# Patient Record
Sex: MALE | Race: WHITE
[De-identification: names, ages, dates, MRNs, and addresses within clinical notes are randomized per-mention and may not be internally consistent; named-entity substitution may affect disease eponyms.]

---

## 2020-08-27 ENCOUNTER — HOSPITAL ENCOUNTER (EMERGENCY)
Dept: HOSPITAL 35 - ER | Age: 75
Discharge: LEFT BEFORE DISCHARGE | End: 2020-08-27
Payer: COMMERCIAL

## 2020-08-27 VITALS — SYSTOLIC BLOOD PRESSURE: 134 MMHG | DIASTOLIC BLOOD PRESSURE: 74 MMHG

## 2020-08-27 VITALS — BODY MASS INDEX: 25.9 KG/M2 | HEIGHT: 71 IN | WEIGHT: 185.01 LBS

## 2020-08-27 DIAGNOSIS — Z79.899: ICD-10-CM

## 2020-08-27 DIAGNOSIS — F17.210: ICD-10-CM

## 2020-08-27 DIAGNOSIS — Z79.82: ICD-10-CM

## 2020-08-27 DIAGNOSIS — R55: Primary | ICD-10-CM

## 2020-08-27 DIAGNOSIS — Z53.29: ICD-10-CM

## 2020-08-27 LAB
ALBUMIN SERPL-MCNC: 3.5 G/DL (ref 3.4–5)
ALT SERPL-CCNC: 19 U/L (ref 30–65)
ANION GAP SERPL CALC-SCNC: 13 MMOL/L (ref 7–16)
AST SERPL-CCNC: 26 U/L (ref 15–37)
BASOPHILS NFR BLD AUTO: 0.2 % (ref 0–2)
BILIRUB DIRECT SERPL-MCNC: 0.1 MG/DL
BILIRUB SERPL-MCNC: 0.6 MG/DL (ref 0.2–1)
BUN SERPL-MCNC: 11 MG/DL (ref 7–18)
CALCIUM SERPL-MCNC: 8.6 MG/DL (ref 8.5–10.1)
CHLORIDE SERPL-SCNC: 103 MMOL/L (ref 98–107)
CO2 SERPL-SCNC: 22 MMOL/L (ref 21–32)
CREAT SERPL-MCNC: 0.9 MG/DL (ref 0.7–1.3)
EOSINOPHIL NFR BLD: 1.2 % (ref 0–3)
ERYTHROCYTE [DISTWIDTH] IN BLOOD BY AUTOMATED COUNT: 14.2 % (ref 10.5–14.5)
GLUCOSE SERPL-MCNC: 96 MG/DL (ref 74–106)
GRANULOCYTES NFR BLD MANUAL: 77.3 % (ref 36–66)
HCT VFR BLD CALC: 42.8 % (ref 42–52)
HGB BLD-MCNC: 14.1 GM/DL (ref 14–18)
LYMPHOCYTES NFR BLD AUTO: 12.7 % (ref 24–44)
MAGNESIUM SERPL-MCNC: 2 MG/DL (ref 1.8–2.4)
MCH RBC QN AUTO: 31.5 PG (ref 26–34)
MCHC RBC AUTO-ENTMCNC: 33 G/DL (ref 28–37)
MCV RBC: 95.6 FL (ref 80–100)
MONOCYTES NFR BLD: 8.6 % (ref 1–8)
NEUTROPHILS # BLD: 10.6 THOU/UL (ref 1.4–8.2)
PLATELET # BLD: 187 THOU/UL (ref 150–400)
POTASSIUM SERPL-SCNC: 4.6 MMOL/L (ref 3.5–5.1)
PROT SERPL-MCNC: 6.7 G/DL (ref 6.4–8.2)
RBC # BLD AUTO: 4.48 MIL/UL (ref 4.5–6)
SODIUM SERPL-SCNC: 138 MMOL/L (ref 136–145)
TROPONIN I SERPL-MCNC: <0.06 NG/ML (ref ?–0.06)
WBC # BLD AUTO: 13.8 THOU/UL (ref 4–11)
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
## 2020-08-28 NOTE — EKG
[facility]
[place]   [address]                     ELECTROCARDIOGRAM REPORT      
_______________________________________________________________________________
 
Name:       MELCHOR MELO          Room #:                     DEP   
ISMA#:      [id number]                       Account #:      [id number]  
Admission:  20    Attend Phys:                          
Discharge:  20    Date of Birth:  [birth year]  
                                                          Report #: [id number]
                                                                    [id number]
_______________________________________________________________________________
THIS REPORT FOR:  
 
cc:  Blake Rdz MD, Neal A. MD Lundgren,Chris ABDALLA MD [facility]                                        ~
THIS REPORT FOR:   //name//                          
 
                         [facility] ED
                                       
Test Date:    2020               Test Time:    19:17:30
Pat Name:     MELCHOR MELO             Department:   
Patient ID:   [id number]            Room:          
Gender:                               Technician:   [facility]
:          1945               Requested By: Mayte Pardo
Order Number: [id number] MD:   Chris Middleton
                                 Measurements
Intervals                              Axis          
Rate:         61                       P:            108
UT:           287                      QRS:          4
QRSD:         113                      T:            -1
QT:           422                                    
QTc:          425                                    
                           Interpretive Statements
Sinus rhythm
Occasional premature ventricular complexes
Prolonged UT interval
Probable inferior infarct, age indeterminate
Compared to ECG 10/20/2010 01:01:58
Ventricular premature complex(es) now present
Inferior Q waves are more pronounced
Electronically Signed On 2020 16:44:49 CDT by Chris Middleton
[email or web address]
 
 
 
 
 
 
 
 
 
 
 
 
  <ELECTRONICALLY SIGNED>
   By: Chris Middleton MD, FAC   
  20     1644
D: 20                           _____________________________________
T: 20                           Chris Middleton MD, [facility]     /EPI

## 2020-08-31 ENCOUNTER — HOSPITAL ENCOUNTER (OUTPATIENT)
Dept: HOSPITAL 35 - SJCVC | Age: 75
End: 2020-08-31
Attending: INTERNAL MEDICINE
Payer: COMMERCIAL

## 2020-08-31 DIAGNOSIS — E78.2: ICD-10-CM

## 2020-08-31 DIAGNOSIS — R94.31: Primary | ICD-10-CM

## 2020-08-31 DIAGNOSIS — Z79.899: ICD-10-CM

## 2020-08-31 DIAGNOSIS — I10: ICD-10-CM

## 2020-09-16 ENCOUNTER — HOSPITAL ENCOUNTER (OUTPATIENT)
Dept: HOSPITAL 35 - SJCVCIMAG | Age: 75
End: 2020-09-16
Attending: INTERNAL MEDICINE
Payer: COMMERCIAL

## 2020-09-16 DIAGNOSIS — Z72.0: ICD-10-CM

## 2020-09-16 DIAGNOSIS — I44.0: ICD-10-CM

## 2020-09-16 DIAGNOSIS — I10: ICD-10-CM

## 2020-09-16 DIAGNOSIS — R00.0: ICD-10-CM

## 2020-09-16 DIAGNOSIS — I08.1: Primary | ICD-10-CM

## 2020-09-16 DIAGNOSIS — E78.5: ICD-10-CM
